# Patient Record
Sex: MALE | Race: WHITE | Employment: FULL TIME | URBAN - METROPOLITAN AREA
[De-identification: names, ages, dates, MRNs, and addresses within clinical notes are randomized per-mention and may not be internally consistent; named-entity substitution may affect disease eponyms.]

---

## 2023-06-08 ENCOUNTER — OFFICE VISIT (OUTPATIENT)
Dept: PRIMARY CARE CLINIC | Age: 23
End: 2023-06-08

## 2023-06-08 VITALS
DIASTOLIC BLOOD PRESSURE: 68 MMHG | OXYGEN SATURATION: 97 % | SYSTOLIC BLOOD PRESSURE: 110 MMHG | HEART RATE: 73 BPM | WEIGHT: 177 LBS | TEMPERATURE: 98.9 F

## 2023-06-08 DIAGNOSIS — Z00.00 ANNUAL PHYSICAL EXAM: Primary | ICD-10-CM

## 2023-06-08 DIAGNOSIS — S69.91XA INJURY OF FINGER OF RIGHT HAND, INITIAL ENCOUNTER: ICD-10-CM

## 2023-06-08 RX ORDER — IBUPROFEN 800 MG/1
800 TABLET ORAL 2 TIMES DAILY WITH MEALS
Qty: 10 TABLET | Refills: 0 | Status: SHIPPED | OUTPATIENT
Start: 2023-06-08 | End: 2023-06-13

## 2023-06-08 ASSESSMENT — ENCOUNTER SYMPTOMS
SHORTNESS OF BREATH: 0
DIARRHEA: 0
BLOOD IN STOOL: 1
NAUSEA: 0
COUGH: 0
SORE THROAT: 0
ABDOMINAL PAIN: 0
VOMITING: 0

## 2023-06-08 ASSESSMENT — PATIENT HEALTH QUESTIONNAIRE - PHQ9
SUM OF ALL RESPONSES TO PHQ9 QUESTIONS 1 & 2: 0
SUM OF ALL RESPONSES TO PHQ QUESTIONS 1-9: 0
2. FEELING DOWN, DEPRESSED OR HOPELESS: 0
SUM OF ALL RESPONSES TO PHQ QUESTIONS 1-9: 0
SUM OF ALL RESPONSES TO PHQ QUESTIONS 1-9: 0
1. LITTLE INTEREST OR PLEASURE IN DOING THINGS: 0
SUM OF ALL RESPONSES TO PHQ QUESTIONS 1-9: 0

## 2023-06-08 NOTE — PROGRESS NOTES
Pulmonary:      Effort: Pulmonary effort is normal.      Breath sounds: Normal breath sounds. Abdominal:      Palpations: Abdomen is soft. Tenderness: There is no abdominal tenderness. Genitourinary:     Penis: Normal.       Comments: No testicular masses or hernias noted; No inguinal adenopathy noted B/L; No CVA tendernous to percussion noted B/L  Musculoskeletal:      Comments: No leg edema noted B/L; nontender vertebral spine to palpation; R hand : positive swelling with ecchymosis noted of 5th digit with associated tendernous to palpation noted over PIP and MCP joints   Lymphadenopathy:      Cervical: No cervical adenopathy. Skin:     Findings: No rash. Neurological:      Mental Status: He is alert. Comments: Cranial nerves 2 - 12 grossly intact; Muscle strength 5/5 throughout   Psychiatric:         Mood and Affect: Mood normal.       ASSESSMENT AND PLAN    1. Annual physical exam  Pt is with stable VS and an unremarkable exam and will check fasting bloodwork  - CBC with Auto Differential; Future  - Comprehensive Metabolic Panel; Future  - TSH with Reflex; Future  - Lipid Panel; Future  - Urinalysis with Reflex to Culture; Future    2. Injury of finger of right hand, initial encounter  Will check xray R hand to rule out any fracture and treat pt with an NSAID and pt was told to ice the area involved for 20 min twice a day and to notify MD if you note no improvement in 3 -4 days  - ibuprofen (ADVIL;MOTRIN) 800 MG tablet; Take 1 tablet by mouth 2 times daily (with meals) for 5 days  Dispense: 10 tablet; Refill: 0  - XR HAND RIGHT (MIN 3 VIEWS); Future        Fabrizio Arreola MD      Return in about 1 year (around 6/8/2024).

## 2024-04-17 ENCOUNTER — OFFICE VISIT (OUTPATIENT)
Dept: PRIMARY CARE CLINIC | Age: 24
End: 2024-04-17
Payer: COMMERCIAL

## 2024-04-17 VITALS
HEIGHT: 69 IN | WEIGHT: 176 LBS | SYSTOLIC BLOOD PRESSURE: 120 MMHG | HEART RATE: 79 BPM | OXYGEN SATURATION: 98 % | BODY MASS INDEX: 26.07 KG/M2 | TEMPERATURE: 98.1 F | RESPIRATION RATE: 16 BRPM | DIASTOLIC BLOOD PRESSURE: 78 MMHG

## 2024-04-17 DIAGNOSIS — B80 PINWORM INFECTION: Primary | ICD-10-CM

## 2024-04-17 PROCEDURE — 99213 OFFICE O/P EST LOW 20 MIN: CPT | Performed by: FAMILY MEDICINE

## 2024-04-17 SDOH — ECONOMIC STABILITY: FOOD INSECURITY: WITHIN THE PAST 12 MONTHS, THE FOOD YOU BOUGHT JUST DIDN'T LAST AND YOU DIDN'T HAVE MONEY TO GET MORE.: NEVER TRUE

## 2024-04-17 SDOH — ECONOMIC STABILITY: FOOD INSECURITY: WITHIN THE PAST 12 MONTHS, YOU WORRIED THAT YOUR FOOD WOULD RUN OUT BEFORE YOU GOT MONEY TO BUY MORE.: NEVER TRUE

## 2024-04-17 SDOH — ECONOMIC STABILITY: INCOME INSECURITY: HOW HARD IS IT FOR YOU TO PAY FOR THE VERY BASICS LIKE FOOD, HOUSING, MEDICAL CARE, AND HEATING?: NOT HARD AT ALL

## 2024-04-17 SDOH — ECONOMIC STABILITY: HOUSING INSECURITY
IN THE LAST 12 MONTHS, WAS THERE A TIME WHEN YOU DID NOT HAVE A STEADY PLACE TO SLEEP OR SLEPT IN A SHELTER (INCLUDING NOW)?: NO

## 2024-04-17 ASSESSMENT — PATIENT HEALTH QUESTIONNAIRE - PHQ9
2. FEELING DOWN, DEPRESSED OR HOPELESS: NOT AT ALL
SUM OF ALL RESPONSES TO PHQ QUESTIONS 1-9: 0
SUM OF ALL RESPONSES TO PHQ9 QUESTIONS 1 & 2: 0
1. LITTLE INTEREST OR PLEASURE IN DOING THINGS: NOT AT ALL
SUM OF ALL RESPONSES TO PHQ QUESTIONS 1-9: 0

## 2024-04-17 NOTE — PROGRESS NOTES
Chief Complaint   Patient presents with    Other     Patient noticed pin worms in his toilet.         Geraldo Chauhan is a 24 y.o. male who presents complaining of pinworms in his stool x 3 weeks. Pt tried an OTC medication with no relief     Pt denies any hx of diabetes, asthma or HTN and denies any surgeries      Pt is a nonsmoker and uses alcohol occasionally and denies any illegal drug use     Pt did not do any COVID vaccines     FHx negative for diabetes or thyroid disease      Review of Systems   Constitutional:  Negative for fatigue and fever.   HENT:  Negative for nosebleeds and sore throat.    Eyes:         Negative blurred vision or diplopia   Respiratory:  Negative for cough and shortness of breath.    Cardiovascular:  Negative for chest pain, palpitations and leg swelling.   Gastrointestinal:  Negative for abdominal pain, blood in stool, diarrhea, nausea and vomiting.        Negative melena; Positive indigestion at times   Endocrine: Negative for polydipsia and polyuria.   Genitourinary:  Negative for dysuria and hematuria.   Musculoskeletal:  Negative for arthralgias.   Skin:  Negative for rash.   Neurological:  Negative for dizziness, seizures, syncope and headaches.   Psychiatric/Behavioral:  Negative for dysphoric mood. The patient is not nervous/anxious.          Vitals:    04/17/24 1327   BP: 120/78   Pulse: 79   Resp: 16   Temp: 98.1 °F (36.7 °C)   SpO2: 98%         Physical Exam  Vitals reviewed.   Constitutional:       General: He is not in acute distress.  HENT:      Mouth/Throat:      Mouth: Mucous membranes are moist.      Pharynx: Oropharynx is clear.   Eyes:      General: No scleral icterus.     Comments: Pink conjunctivae    Neck:      Thyroid: No thyromegaly.   Cardiovascular:      Heart sounds: Normal heart sounds. No murmur heard.     No friction rub. No gallop.   Pulmonary:      Effort: Pulmonary effort is normal.      Breath sounds: Normal breath sounds.   Abdominal:      Palpations:

## 2024-04-18 ENCOUNTER — TELEPHONE (OUTPATIENT)
Dept: FAMILY MEDICINE CLINIC | Age: 24
End: 2024-04-18

## 2024-04-18 NOTE — TELEPHONE ENCOUNTER
SUBMITTED PA FOR Emverm 100MG chewable tablets  VIA [facility]: [id number] STATUS PENDING.      FOLLOW UP DONE DAILY: IF NO RESPONSE IN 3 DAYS WE WILL REFAX FOR STATUS CHECK. IF ANOTHER 3 DAYS GOES BY WITH NO RESPONSE WILL CALL INSURANCE FOR STATUS.

## 2024-04-19 ENCOUNTER — PATIENT MESSAGE (OUTPATIENT)
Dept: PRIMARY CARE CLINIC | Age: 24
End: 2024-04-19

## 2024-04-19 NOTE — TELEPHONE ENCOUNTER
Tried to call the patient x 2, however his phone is not in service. Sent patient a [employer] message.     No further action is needed for this encounter

## 2024-04-25 NOTE — TELEPHONE ENCOUNTER
Spoke to the patient and he has been notified that a new medication that may be covered under his insurance was sent to .

## 2024-04-25 NOTE — TELEPHONE ENCOUNTER
Patient has been notified of a new medication sent to the pharmacy.     No further action is needed for this encounter

## 2024-04-25 NOTE — TELEPHONE ENCOUNTER
According to the patients denial letter he has to try a mediation that is covered by his plan for at least 30 days beofre this medication can be covered. They recommended pyrantel pamoate. Copy of the denial letter is posted below. Preferred medication has been pended. Please advise.

## 2024-10-17 ENCOUNTER — OFFICE VISIT (OUTPATIENT)
Dept: PRIMARY CARE CLINIC | Age: 24
End: 2024-10-17

## 2024-10-17 VITALS
HEIGHT: 69 IN | OXYGEN SATURATION: 98 % | HEART RATE: 70 BPM | WEIGHT: 177.8 LBS | BODY MASS INDEX: 26.33 KG/M2 | DIASTOLIC BLOOD PRESSURE: 84 MMHG | SYSTOLIC BLOOD PRESSURE: 126 MMHG | TEMPERATURE: 97.8 F | RESPIRATION RATE: 16 BRPM

## 2024-10-17 DIAGNOSIS — Z00.00 ANNUAL PHYSICAL EXAM: Primary | ICD-10-CM

## 2024-10-17 LAB
ALBUMIN SERPL-MCNC: 4.6 G/DL (ref 3.4–5)
ALBUMIN/GLOB SERPL: 2.3 {RATIO} (ref 1.1–2.2)
ALP SERPL-CCNC: 60 U/L (ref 40–129)
ALT SERPL-CCNC: 23 U/L (ref 10–40)
ANION GAP SERPL CALCULATED.3IONS-SCNC: 12 MMOL/L (ref 3–16)
AST SERPL-CCNC: 34 U/L (ref 15–37)
BASOPHILS # BLD: 0.1 K/UL (ref 0–0.2)
BASOPHILS NFR BLD: 0.8 %
BILIRUB SERPL-MCNC: 1.6 MG/DL (ref 0–1)
BILIRUB UR QL STRIP.AUTO: NEGATIVE
BUN SERPL-MCNC: 10 MG/DL (ref 7–20)
CALCIUM SERPL-MCNC: 9.8 MG/DL (ref 8.3–10.6)
CHLORIDE SERPL-SCNC: 102 MMOL/L (ref 99–110)
CHOLEST SERPL-MCNC: 184 MG/DL (ref 0–199)
CLARITY UR: CLEAR
CO2 SERPL-SCNC: 27 MMOL/L (ref 21–32)
COLOR UR: YELLOW
CREAT SERPL-MCNC: 1 MG/DL (ref 0.9–1.3)
DEPRECATED RDW RBC AUTO: 13.4 % (ref 12.4–15.4)
EOSINOPHIL # BLD: 0.2 K/UL (ref 0–0.6)
EOSINOPHIL NFR BLD: 2.5 %
GFR SERPLBLD CREATININE-BSD FMLA CKD-EPI: >90 ML/MIN/{1.73_M2}
GLUCOSE SERPL-MCNC: 92 MG/DL (ref 70–99)
GLUCOSE UR STRIP.AUTO-MCNC: NEGATIVE MG/DL
HCT VFR BLD AUTO: 47 % (ref 40.5–52.5)
HDLC SERPL-MCNC: 53 MG/DL (ref 40–60)
HGB BLD-MCNC: 15.6 G/DL (ref 13.5–17.5)
HGB UR QL STRIP.AUTO: NEGATIVE
KETONES UR STRIP.AUTO-MCNC: NEGATIVE MG/DL
LDLC SERPL CALC-MCNC: 115 MG/DL
LEUKOCYTE ESTERASE UR QL STRIP.AUTO: NEGATIVE
LYMPHOCYTES # BLD: 2 K/UL (ref 1–5.1)
LYMPHOCYTES NFR BLD: 32.1 %
MCH RBC QN AUTO: 31.3 PG (ref 26–34)
MCHC RBC AUTO-ENTMCNC: 33.2 G/DL (ref 31–36)
MCV RBC AUTO: 94.3 FL (ref 80–100)
MONOCYTES # BLD: 0.5 K/UL (ref 0–1.3)
MONOCYTES NFR BLD: 8.6 %
NEUTROPHILS # BLD: 3.5 K/UL (ref 1.7–7.7)
NEUTROPHILS NFR BLD: 56 %
NITRITE UR QL STRIP.AUTO: NEGATIVE
PH UR STRIP.AUTO: 7 [PH] (ref 5–8)
PLATELET # BLD AUTO: 216 K/UL (ref 135–450)
PMV BLD AUTO: 9 FL (ref 5–10.5)
POTASSIUM SERPL-SCNC: 4.6 MMOL/L (ref 3.5–5.1)
PROT SERPL-MCNC: 6.6 G/DL (ref 6.4–8.2)
PROT UR STRIP.AUTO-MCNC: NEGATIVE MG/DL
RBC # BLD AUTO: 4.98 M/UL (ref 4.2–5.9)
SODIUM SERPL-SCNC: 141 MMOL/L (ref 136–145)
SP GR UR STRIP.AUTO: 1.02 (ref 1–1.03)
TRIGL SERPL-MCNC: 82 MG/DL (ref 0–150)
TSH SERPL DL<=0.005 MIU/L-ACNC: 2.82 UIU/ML (ref 0.27–4.2)
UA COMPLETE W REFLEX CULTURE PNL UR: NORMAL
UA DIPSTICK W REFLEX MICRO PNL UR: NORMAL
URN SPEC COLLECT METH UR: NORMAL
UROBILINOGEN UR STRIP-ACNC: 1 E.U./DL
VLDLC SERPL CALC-MCNC: 16 MG/DL
WBC # BLD AUTO: 6.2 K/UL (ref 4–11)

## 2024-10-17 NOTE — PROGRESS NOTES
Chief Complaint   Patient presents with    6 Month Follow-Up         Geraldo Chauhan is a 24 y.o. male who presents for yearly physical    Pt took Rx vermox medication with resolution of pinworms    Pt did not do xray R hand or bloodwork as directed     Pt denies any hx of diabetes, asthma or HTN and denies any surgeries      Pt is a nonsmoker and uses alcohol occasionally and denies any illegal drug use     Pt did not do any COVID vaccines     FHx negative for diabetes or thyroid disease      Review of Systems   Constitutional:  Negative for fatigue and fever.   HENT:  Negative for congestion, nosebleeds and sore throat.    Eyes:         Negative blurred vision or diplopia   Respiratory:  Negative for cough and shortness of breath.    Cardiovascular:  Negative for chest pain, palpitations and leg swelling.   Gastrointestinal:  Negative for abdominal pain, blood in stool, diarrhea, nausea and vomiting.        Negative melena; Positive indigestion at times   Endocrine: Negative for polydipsia and polyuria.   Genitourinary:  Negative for dysuria and hematuria.   Musculoskeletal:  Negative for arthralgias.   Skin:  Negative for rash.   Neurological:  Negative for dizziness, seizures, syncope, speech difficulty, weakness and headaches.   Psychiatric/Behavioral:  Negative for dysphoric mood. The patient is not nervous/anxious.          Vitals:    10/17/24 0747   BP: 126/84   Pulse: 70   Resp: 16   Temp: 97.8 °F (36.6 °C)   SpO2: 98%         Physical Exam  Vitals reviewed.   Constitutional:       General: He is not in acute distress.  HENT:      Mouth/Throat:      Mouth: Mucous membranes are moist.      Pharynx: Oropharynx is clear.   Eyes:      General: No scleral icterus.     Comments: Pink conjunctivae    Neck:      Thyroid: No thyromegaly.      Comments: No carotid bruits noted B/L  Cardiovascular:      Heart sounds: Normal heart sounds. No murmur heard.     No friction rub. No gallop.   Pulmonary:      Effort: